# Patient Record
Sex: FEMALE | Race: BLACK OR AFRICAN AMERICAN | NOT HISPANIC OR LATINO | URBAN - METROPOLITAN AREA
[De-identification: names, ages, dates, MRNs, and addresses within clinical notes are randomized per-mention and may not be internally consistent; named-entity substitution may affect disease eponyms.]

---

## 2018-05-09 ENCOUNTER — HOSPITAL ENCOUNTER (EMERGENCY)
Facility: CLINIC | Age: 23
Discharge: HOME | End: 2018-05-09
Attending: EMERGENCY MEDICINE | Admitting: EMERGENCY MEDICINE
Payer: COMMERCIAL

## 2018-05-09 VITALS
HEART RATE: 63 BPM | DIASTOLIC BLOOD PRESSURE: 91 MMHG | TEMPERATURE: 98.4 F | SYSTOLIC BLOOD PRESSURE: 123 MMHG | RESPIRATION RATE: 14 BRPM | OXYGEN SATURATION: 98 %

## 2018-05-09 DIAGNOSIS — F31.60 BIPOLAR AFFECTIVE DISORDER, CURRENT EPISODE MIXED, CURRENT EPISODE SEVERITY UNSPECIFIED (H): ICD-10-CM

## 2018-05-09 DIAGNOSIS — F10.920 ALCOHOLIC INTOXICATION WITHOUT COMPLICATION (H): ICD-10-CM

## 2018-05-09 LAB — ALCOHOL BREATH TEST: 0.11 (ref 0–0.01)

## 2018-05-09 PROCEDURE — 90791 PSYCH DIAGNOSTIC EVALUATION: CPT

## 2018-05-09 PROCEDURE — 99285 EMERGENCY DEPT VISIT HI MDM: CPT | Mod: 25

## 2018-05-09 ASSESSMENT — ENCOUNTER SYMPTOMS
NERVOUS/ANXIOUS: 1
HALLUCINATIONS: 0

## 2018-05-09 NOTE — ED AVS SNAPSHOT
Emergency Department    [address] [facility] [address]    Phone:  [phone]    Fax:  [phone]                                       Zuleika Sales   MRN: [id number]    Department:   Emergency Department   Date of Visit:  5/9/2018           After Visit Summary Signature Page     I have received my discharge instructions, and my questions have been answered. I have discussed any challenges I see with this plan with the nurse or doctor.    ..........................................................................................................................................  Patient/Patient Representative Signature      ..........................................................................................................................................  Patient Representative Print Name and Relationship to Patient    ..................................................               ................................................  Date                                            Time    ..........................................................................................................................................  Reviewed by Signature/Title    ...................................................              ..............................................  Date                                                            Time

## 2018-05-09 NOTE — DISCHARGE INSTRUCTIONS
"  Alcohol Intoxication  Alcohol intoxication occurs when you drink alcohol faster than your liver can remove it from your system. The following facts are important to remember:    It can take 10 minutes or more to start to feel the effects of a drink, so you can easily get more intoxicated than you intended.    One drink may be more than 1 serving of alcohol. Depending on the drink, it can be 2 to 4 servings.    It takes about an hour for your body to metabolize (clear) 1 serving. If you have more than 1 drink, it can take a couple of hours or more.    Many things affect how drinks will affect you, including whether you ve eaten, how fast you drink, your size, how much you normally drink (or not), medicines you take, chronic diseases you have, and gender.  Signs and symptoms of alcohol poisoning  The following are signs and symptoms of alcohol poisoning:  Mild impairment    Reduced inhibitions    Slurred speech    Drowsiness    Decreased fine motor skills  Moderate impairment    Erratic behavior, aggression, depression    Impaired judgment    Confusion    Concentration difficulties    Coordination problems  Severe impairment    Vomiting    Seizures    Unconsciousness    Cold, clammy    Slow or irregular breathing    Hypothermia (low body temperature)    Coma  Health effects  Alcohol abuse causes health problems. Sometimes this can happen after only drinking a  little.\" There is no set number of drinks or amount of alcohol that defines too much. The more you drink at one time, and the more frequently you drink determine both the short-term and long-term health effects. It affects all parts of your body and your health, including your:    Brain. Alcohol is a central nervous system depressant. It can damage parts of the brain that affect your balance, memory, thinking, and emotions. It can cause memory loss, blackouts, depression, agitation, sleep cycle changes, and seizures. These changes may or may not be " reversible.    Heart and vascular system. Alcohol affects multiple areas. It can damage heart muscle causing cardiomyopathy, which is a weakening and stretching of the heart muscle. This can lead to trouble breathing, an irregular heartbeat, atrial fibrillation, leg swelling, and heart failure. It makes the blood vessels stiffen causing hypertension (high blood pressure). All of these problems increase your risk of having heart attacks or strokes.    Liver. Alcohol causes fat to build up in the liver, affecting its normal function. This increases the risk for hepatitis, leading to abdominal pain, appetite loss, jaundice, bleeding problems, liver fibrosis, and cirrhosis. This in turn can affect your ability to fight off infections, and can cause diabetes. The liver changes prevent it from removing toxins in your blood that can cause encephalopathy. Signs of this are confusion, altered level of consciousness, personality changes, memory loss, seizures, coma, and death.    Pancreas. Alcohol can cause inflammation of the pancreas, or pancreatitis. This can cause pain in your abdomen, fever, and diabetes.    Immune system. Alcohol weakens your immune system in a number of ways. It suppresses your immune system making it harder to fight off infections and colds. You will also have a higher risk of certain infections like pneumonia and tuberculosis.    Cancer risk. Alcohol raises your risk of cancer of the mouth, esophagus, pharynx, larynx, liver, and breast.    Sexual function. Alcohol abuse can also lead to sexual problems.  Alcohol use during pregnancy may cause permanent damage to the growing baby.  Home care  The following guidelines will help you care for yourself at home:    Don't drink any more alcohol.    Don't drive until all effects of the alcohol have worn off.    Don't operate machinery that can cause injuries.    Get lots of rest over the next few days. Drink plenty of water and other non-alcoholic liquids.  Try to eat regular meals.    If you have been drinking heavily on a daily basis, you may go through alcohol withdrawal. The usual symptoms last 3 to 4 days and may include nervousness, shakiness, nausea, sweating, sleeplessness, and can even cause seizures and a serious withdrawal symptom called delirium tremens, or DTs. During this time, it is best that you stay with family or friends who can help and support you. You can also admit yourself to a residential detox program. If your symptoms are severe (seizures, severe shakiness, confusion), contact your doctor or call an ambulance for help (see below).   Follow-up care  If alcohol is a problem in your life, these are some organizations that can help you:    Alcoholics Anonymous offers support through a self-help fellowship. There are no dues or fees. See the Yellow Pages and call for time and place of meetings. Find AA online at www.aa.org.    [employer] offers support to families of alcohol users. Contact 924-024-8144, or online at www.al-anovipin.org.    National [place] on Alcoholism and Drug Dependence can be reached at 728-293-5288, or online at www.ncadd.org.    There are also inpatient and residential alcohol detox programs. Check the Internet or phonebook Yellow Pages under  D[facility] Abuse and Treatment Centers.   Call 911  Call 911 if any of these occur:    Trouble breathing or slow irregular breathing    Chest pain    Sudden weakness on one side of your body or sudden trouble speaking    Heavy bleeding or vomiting blood    Very drowsy or trouble awakening    Fainting or loss of consciousness    Rapid heart rate    Seizure  When to seek medical advice  Call your healthcare provider right away if any of these occur:    Severe shakiness     Fever of 100.4 F (38 C) or higher, or as directed by your healthcare provider    Confusion or hallucinations (seeing, hearing, or feeling things that are not there)    Pain in your upper abdomen that gets worse    Repeated  "vomiting  Date Last Reviewed: 6/1/2016 2000-2017 [employer]. [address] [place] [address], [place] [address]. All rights reserved. This information is not intended as a substitute for professional medical care. Always follow your healthcare professional's instructions.          Bipolar Disorder  Bipolar disorder is an illness that causes strong mood swings between depression and  carlee . It used to be called \"manic depression.\" The mood swings are different from the normal ups and downs we all experience in our lives. They are more severe, last longer, and can interfere with work and relationships. These episodes are changes from our usual moods and behavior. Their severity can be mild, or drastic and explosive.    In a manic episode, you may think fast and do things quickly. It may seem like you are getting a lot done. At first, this may feel very good. But in the extreme this can lead to a lifestyle that is disorganized, chaotic, and includes risky behavior (spending sprees, sexual acting-out, or drug use). In later stages, it may affect eating (no interest in food) and sleeping (unable to sleep for days at a time). Speech may speed up and become difficult for others to understand. You may appear to others as if you are in your own world.    In a depressive episode, you may feel a lack of interest in normal activities. Sometimes there is sadness or guilt without any clear reason. Thinking may become slow and there can be a lack energy or feeling of hopelessness. Some people have thoughts of harming themselves at this stage. Thoughts can even turn to suicide.  Between these phases you may actually feel OK. This does not mean that the illness is gone. People with this disorder will usually have to treat it all of their life. Medicine and good care can greatly reduce the symptoms.  The exact cause of this illness is unknown. However, there is a genetic link that makes a person more likely to get this " problem. Also, the use of drugs such as speed (amphetamine) and cocaine increase the chances of this illness appearing.  Home care  Here is what you can do at home:    Ongoing care and support help people manage this disease. Find a healthcare provider and therapist who meet your needs. Seek help when you feel like you may be heading into either a manic episode or a depressive state.    Be sure to take your medicine and get regular blood work to check the levels of medicine in your body. Take the medicine and get the follow-up lab work as prescribed, even if you think you don t need to do it.    Be certain to tell each of your healthcare providers about all of the prescription and over-the-counter medicines, and supplements you take. Certain supplements interact with medicines and result in dangerous side effects. You can also use your pharmacist as a resource person when you have questions about medicine interactions.    Talk with your family and trusted friends about your thoughts and feelings. Ask them to help you recognize behavior changes early so you can get help and medicines can be adjusted.    Alcohol and drugs can bring on an episode, and make them worse    If your life is severely impacted by this illness, the Americans with Disabilities Act (ADA) may provide help. The ADA protects people with chronic physical and mental health problems. If you are having trouble keeping jobs, managing workplace issues, or caring for yourself because of your bipolar disorder, contact your local ADA office to see if it can help. The [place] Department of [employer] operates a toll-free ADA information line at: 108.847.6696 (Voice); or 565-146-7020 (TTY). It can help you locate a local office.    Follow-up care  Follow up with your healthcare provider or therapist as advised. They can help you to find ways to improve your life.  Call 911  Call 911 if any of these happen:    You have suicidal thoughts, a plan, and the means to  harm  yourself, or serious thoughts of hurting someone else    Trouble breathing    Confusion    Drowsiness or trouble wakening    Fainting or loss of consciousness    Rapid heart rate, very low heart rate, or a new irregular heart rate    Seizure    New chest pain that becomes more severe, lasts longer, or spreads into your shoulder, arm, neck, jaw, or back  When to seek medical advice  Call your healthcare provider right away if any of these happen:    Feeling like your symptoms are getting worse (depression, agitation, and excess energy)    Unable to eat or sleep for more than 48 hours    Feeling out of control (racing thoughts, or poor concentration)    Feeling like you want to harm yourself or another    Being unable to care for yourself  Date Last Reviewed: 10/1/2017    9769-2759 [employer]. [address] [place] [address], [place] [address]. All rights reserved. This information is not intended as a substitute for professional medical care. Always follow your healthcare professional's instructions.

## 2018-05-09 NOTE — ED PROVIDER NOTES
History     Chief Complaint:  Alcohol Intoxication    HPI   Zuleika Sales is a 23-year-old female with a reported history of bipolar disorder who presents to the emergency department in a police hold for evaluation of alcohol intoxication.  Per police report, the patient was walking around hotel parking lots and was paranoid stating that she thinks people are trying to kill her.  The patient blew 0.18 on the police breathalyzer.  The police were concerned that the patient was not able to take care of herself so they brought her in for evaluation.    The patient states that she was drinking with her cousin tonight, when all of a sudden her cousin got quite agitated with her and left her in a parking lot.  The patient states that her cousin's child was in a car in the parking lot and she was watching over this child while the cousin was doing things in the apartment.  The cousin then came out to the parking lot and got quite angry with the patient and drove away.    The patient denies any suicidal ideation.  She states that she has not been taking her bipolar medications for over 1 year because she did not like the way they made her feel.  She states that she has been managing moderately well without them.  She notes that she does still experience periods of highs and lows.  In regards to the paranoia, the patient states that she is chronically generally scared that someone will kill her. She is not suicidal. She is not fearful of anyone person.  She denies any hallucinations.    The patient denies any drug intake tonight.  She states that she has otherwise been well and has not had any recent illness. She does not use alcohol regularly, she just turned 23 this past week.      Allergies:  No known drug allergies.     Medications:    No daily medications.     Past Medical History:    Bipolar disorder     Past Surgical History:    Surgical history reviewed. No pertinent surgical history.    Family History:     History reviewed. No pertinent family history.     Social History:  Presents to the ED via police escort     Review of Systems   Psychiatric/Behavioral: Negative for hallucinations, self-injury and suicidal ideas. The patient is nervous/anxious.         Positive for alcohol intoxication and paranoia.   All other systems reviewed and are negative.    Physical Exam     Patient Vitals for the past 24 hrs:   BP Heart Rate SpO2   05/09/18 0405 133/40 126 98 %        Physical Exam  Constitutional:  Smells of alcohol.  Patient is oriented to person, place, and time. They appear well-developed and well-nourished.    HENT:   Mouth/Throat:   Oropharynx is clear and moist.   Eyes:    Conjunctivae are injected, EOM are normal. Pupils are equal, round, and reactive to light.   Neck:    Normal range of motion.   Cardiovascular: Normal rate, regular rhythm and normal heart sounds.  Exam reveals no gallop and no friction rub.  No murmur heard.  Pulmonary/Chest:  Effort normal and breath sounds normal. Patient has no wheezes. Patient has no rales.   Abdominal:   Soft. Bowel sounds are normal. Patient exhibits no mass. There is no tenderness. There is no rebound and no guarding.   Musculoskeletal:  Normal range of motion. Patient exhibits no edema.   Neurological:   Patient is alert and oriented to person, place, and time. Patient has normal strength. No cranial nerve deficit or sensory deficit. GCS 15.   Skin:   Skin is warm and dry. No rash noted. No erythema.   Psychiatric:   Tearful, emotional.  States that she is chronically afraid that somebody will hurt her.  Denies feeling suicidal.    Emergency Department Course   Nursing notes and vitals reviewed.    (6162) I entered the room with my scribe, obtained the history, and performed an exam of the patient as documented above.    The care of the patient was signed out to the oncoming physician.     Impression & Plan    Medical Decision Making:  Zuleika Sales is a  23-year-old female brought in on a transport hold by police concerned for her safety.  They state that she is paranoid that somebody is going to kill her.  She states that this is how she always feels.  She denies feeling suicidal or homicidal.  She denies any hallucinations.  She does endorse using alcohol tonight, but no other illicit drugs.  She states she is relatively naïve to alcohol as she just turned 23.  She did breathalyzed at 0.18, which is consistent with her mental status.  I see no signs of injury on her, there are no report of falls or altercation tonight.  At this time she does require a DEC assessment. TeleDEC is nonfunctional at this time.  The machinery simply does not work, so she will have to wait for the day DEC  to come in.  While the patient does understand this, she is upset about having to stay.  She has been relatively cooperative throughout her stay here.  I will sign out to the a.m. physician pending her DEC assessment.      Diagnosis:    ICD-10-CM   1. Alcoholic intoxication without complication (H) F10.920   2. Bipolar affective disorder, current episode mixed, current episode severity unspecified (H) F31.60     Disposition:  Care of the patient signed out to incoming physician pending DEC assessment.        [facility] EMERGENCY DEPARTMENT         Scribe disclosure:   I, Mook Strickland, am serving as a scribe on 5/9/2018 at 5:28 AM to personally document services performed by Dr. Mendes based on my observations and the provider's statements to me.                Joyce Mendes MD  05/09/18 0712

## 2018-05-09 NOTE — ED NOTES
ED course:  Patient received as a handoff for my partner Dr. Mendes.  On face-to-face evaluation patient denies SI/HI/hallucinations and defers resources for alcohol abuse. Seen by DEC. Patient was offered voluntary admission and deferred.  No indication for hold.  Provided resources for close outpatient follow-up. At the time of discharge patient was clinically sober.    Impression:    ICD-10-CM    1. Alcoholic intoxication without complication (H) F10.920    2. Bipolar affective disorder, current episode mixed, current episode severity unspecified (H) F31.60      Disposition:  Discharged to self-care.     Ryan Jacoob,   05/09/18 6688

## 2018-05-09 NOTE — ED NOTES
Bed: ED17  Expected date: 5/9/18  Expected time: 3:55 AM  Means of arrival: Ambulance  Comments:  Sea Flores 23F  Eval; intox./ hold

## 2018-05-09 NOTE — ED AVS SNAPSHOT
Emergency Department    [address] [facility] [address]    Phone:  [phone]    Fax:  [phone]                                       Zuleika Sales   MRN: [id number]    Department:   Emergency Department   Date of Visit:  5/9/2018           Patient Information     Date Of Birth          1995        Your diagnoses for this visit were:     Alcoholic intoxication without complication (H)     Bipolar affective disorder, current episode mixed, current episode severity unspecified (H)        You were seen by Joyce Mendes MD and Ryan Jacobo DO.      Follow-up Information     Follow up with No Ref-Primary, Physician.    Why:  Avoid alcohol. No driving today. Follow up with resources. Return if thoughts of harming yourself or others        Discharge Instructions         Alcohol Intoxication  Alcohol intoxication occurs when you drink alcohol faster than your liver can remove it from your system. The following facts are important to remember:    It can take 10 minutes or more to start to feel the effects of a drink, so you can easily get more intoxicated than you intended.    One drink may be more than 1 serving of alcohol. Depending on the drink, it can be 2 to 4 servings.    It takes about an hour for your body to metabolize (clear) 1 serving. If you have more than 1 drink, it can take a couple of hours or more.    Many things affect how drinks will affect you, including whether you ve eaten, how fast you drink, your size, how much you normally drink (or not), medicines you take, chronic diseases you have, and gender.  Signs and symptoms of alcohol poisoning  The following are signs and symptoms of alcohol poisoning:  Mild impairment    Reduced inhibitions    Slurred speech    Drowsiness    Decreased fine motor skills  Moderate impairment    Erratic behavior, aggression, depression    Impaired judgment    Confusion    Concentration  "difficulties    Coordination problems  Severe impairment    Vomiting    Seizures    Unconsciousness    Cold, clammy    Slow or irregular breathing    Hypothermia (low body temperature)    Coma  Health effects  Alcohol abuse causes health problems. Sometimes this can happen after only drinking a  little.\" There is no set number of drinks or amount of alcohol that defines too much. The more you drink at one time, and the more frequently you drink determine both the short-term and long-term health effects. It affects all parts of your body and your health, including your:    Brain. Alcohol is a central nervous system depressant. It can damage parts of the brain that affect your balance, memory, thinking, and emotions. It can cause memory loss, blackouts, depression, agitation, sleep cycle changes, and seizures. These changes may or may not be reversible.    Heart and vascular system. Alcohol affects multiple areas. It can damage heart muscle causing cardiomyopathy, which is a weakening and stretching of the heart muscle. This can lead to trouble breathing, an irregular heartbeat, atrial fibrillation, leg swelling, and heart failure. It makes the blood vessels stiffen causing hypertension (high blood pressure). All of these problems increase your risk of having heart attacks or strokes.    Liver. Alcohol causes fat to build up in the liver, affecting its normal function. This increases the risk for hepatitis, leading to abdominal pain, appetite loss, jaundice, bleeding problems, liver fibrosis, and cirrhosis. This in turn can affect your ability to fight off infections, and can cause diabetes. The liver changes prevent it from removing toxins in your blood that can cause encephalopathy. Signs of this are confusion, altered level of consciousness, personality changes, memory loss, seizures, coma, and death.    Pancreas. Alcohol can cause inflammation of the pancreas, or pancreatitis. This can cause pain in your abdomen, " fever, and diabetes.    Immune system. Alcohol weakens your immune system in a number of ways. It suppresses your immune system making it harder to fight off infections and colds. You will also have a higher risk of certain infections like pneumonia and tuberculosis.    Cancer risk. Alcohol raises your risk of cancer of the mouth, esophagus, pharynx, larynx, liver, and breast.    Sexual function. Alcohol abuse can also lead to sexual problems.  Alcohol use during pregnancy may cause permanent damage to the growing baby.  Home care  The following guidelines will help you care for yourself at home:    Don't drink any more alcohol.    Don't drive until all effects of the alcohol have worn off.    Don't operate machinery that can cause injuries.    Get lots of rest over the next few days. Drink plenty of water and other non-alcoholic liquids. Try to eat regular meals.    If you have been drinking heavily on a daily basis, you may go through alcohol withdrawal. The usual symptoms last 3 to 4 days and may include nervousness, shakiness, nausea, sweating, sleeplessness, and can even cause seizures and a serious withdrawal symptom called delirium tremens, or DTs. During this time, it is best that you stay with family or friends who can help and support you. You can also admit yourself to a residential detox program. If your symptoms are severe (seizures, severe shakiness, confusion), contact your doctor or call an ambulance for help (see below).   Follow-up care  If alcohol is a problem in your life, these are some organizations that can help you:    Alcoholics Anonymous offers support through a self-help fellowship. There are no dues or fees. See the Yellow Pages and call for time and place of meetings. Find AA online at www.aa.org.    [employer] offers support to families of alcohol users. Contact 928-140-0987, or online at www.al-anon.org.    National [place] on Alcoholism and Drug Dependence can be reached at 910-944-9768,  "or online at www.ncadd.org.    There are also inpatient and residential alcohol detox programs. Check the Internet or phonebook Yellow Pages under  Drug Abuse and Treatment Centers.   Call 911  Call 911 if any of these occur:    Trouble breathing or slow irregular breathing    Chest pain    Sudden weakness on one side of your body or sudden trouble speaking    Heavy bleeding or vomiting blood    Very drowsy or trouble awakening    Fainting or loss of consciousness    Rapid heart rate    Seizure  When to seek medical advice  Call your healthcare provider right away if any of these occur:    Severe shakiness     Fever of 100.4 F (38 C) or higher, or as directed by your healthcare provider    Confusion or hallucinations (seeing, hearing, or feeling things that are not there)    Pain in your upper abdomen that gets worse    Repeated vomiting  Date Last Reviewed: 6/1/2016 2000-2017 [employer]. [address] [place] [address]. All rights reserved. This information is not intended as a substitute for professional medical care. Always follow your healthcare professional's instructions.          Bipolar Disorder  Bipolar disorder is an illness that causes strong mood swings between depression and  carlee . It used to be called \"manic depression.\" The mood swings are different from the normal ups and downs we all experience in our lives. They are more severe, last longer, and can interfere with work and relationships. These episodes are changes from our usual moods and behavior. Their severity can be mild, or drastic and explosive.    In a manic episode, you may think fast and do things quickly. It may seem like you are getting a lot done. At first, this may feel very good. But in the extreme this can lead to a lifestyle that is disorganized, chaotic, and includes risky behavior (spending sprees, sexual acting-out, or drug use). In later stages, it may affect eating (no interest in food) and " sleeping (unable to sleep for days at a time). Speech may speed up and become difficult for others to understand. You may appear to others as if you are in your own world.    In a depressive episode, you may feel a lack of interest in normal activities. Sometimes there is sadness or guilt without any clear reason. Thinking may become slow and there can be a lack energy or feeling of hopelessness. Some people have thoughts of harming themselves at this stage. Thoughts can even turn to suicide.  Between these phases you may actually feel OK. This does not mean that the illness is gone. People with this disorder will usually have to treat it all of their life. Medicine and good care can greatly reduce the symptoms.  The exact cause of this illness is unknown. However, there is a genetic link that makes a person more likely to get this problem. Also, the use of drugs such as speed (amphetamine) and cocaine increase the chances of this illness appearing.  Home care  Here is what you can do at home:    Ongoing care and support help people manage this disease. Find a healthcare provider and therapist who meet your needs. Seek help when you feel like you may be heading into either a manic episode or a depressive state.    Be sure to take your medicine and get regular blood work to check the levels of medicine in your body. Take the medicine and get the follow-up lab work as prescribed, even if you think you don t need to do it.    Be certain to tell each of your healthcare providers about all of the prescription and over-the-counter medicines, and supplements you take. Certain supplements interact with medicines and result in dangerous side effects. You can also use your pharmacist as a resource person when you have questions about medicine interactions.    Talk with your family and trusted friends about your thoughts and feelings. Ask them to help you recognize behavior changes early so you can get help and medicines can  be adjusted.    Alcohol and drugs can bring on an episode, and make them worse    If your life is severely impacted by this illness, the Americans with Disabilities Act (ADA) may provide help. The ADA protects people with chronic physical and mental health problems. If you are having trouble keeping jobs, managing workplace issues, or caring for yourself because of your bipolar disorder, contact your local ADA office to see if it can help. The [employer] operates a toll-free ADA information line at: 962.803.2713 (Voice); or 116-733-6079 (TTY). It can help you locate a local office.    Follow-up care  Follow up with your healthcare provider or therapist as advised. They can help you to find ways to improve your life.  Call 911  Call 911 if any of these happen:    You have suicidal thoughts, a plan, and the means to  harm yourself, or serious thoughts of hurting someone else    Trouble breathing    Confusion    Drowsiness or trouble wakening    Fainting or loss of consciousness    Rapid heart rate, very low heart rate, or a new irregular heart rate    Seizure    New chest pain that becomes more severe, lasts longer, or spreads into your shoulder, arm, neck, jaw, or back  When to seek medical advice  Call your healthcare provider right away if any of these happen:    Feeling like your symptoms are getting worse (depression, agitation, and excess energy)    Unable to eat or sleep for more than 48 hours    Feeling out of control (racing thoughts, or poor concentration)    Feeling like you want to harm yourself or another    Being unable to care for yourself  Date Last Reviewed: 10/1/2017    9604-8136 [employer]. [address] [place] [address], [place] [address]. All rights reserved. This information is not intended as a substitute for professional medical care. Always follow your healthcare professional's instructions.          24 Hour Appointment Hotline       To make an appointment at any [facility]  clinic, call [phone] ([phone]). If you don't have a family doctor or clinic, we will help you find one. [facility] are conveniently located to serve the needs of you and your family.             Review of your medicines      Notice     You have not been prescribed any medications.            Procedures and tests performed during your visit     Alcohol breath test POCT      Orders Needing Specimen Collection     None      Pending Results     No orders found from 5/7/2018 to 5/10/2018.            Pending Culture Results     No orders found from 5/7/2018 to 5/10/2018.            Pending Results Instructions     If you had any lab results that were not finalized at the time of your Discharge, you can call the ED Lab Result RN at [phone]. You will be contacted by this team for any positive Lab results or changes in treatment. The nurses are available 7 days a week from 10A to 6:30P.  You can leave a message 24 hours per day and they will return your call.        Test Results From Your Hospital Stay        5/9/2018  8:44 AM      Component Results     Component Value Ref Range & Units Status    Alcohol Breath Test 0.109 (A) 0.00 - 0.01 Final                Clinical Quality Measure: Blood Pressure Screening     Your blood pressure was checked while you were in the emergency department today. The last reading we obtained was  BP: 104/77 . Please read the guidelines below about what these numbers mean and what you should do about them.  If your systolic blood pressure (the top number) is less than 120 and your diastolic blood pressure (the bottom number) is less than 80, then your blood pressure is normal. There is nothing more that you need to do about it.  If your systolic blood pressure (the top number) is 120-139 or your diastolic blood pressure (the bottom number) is 80-89, your blood pressure may be higher than it should be. You should have your blood pressure rechecked within a year by a  "primary care provider.  If your systolic blood pressure (the top number) is 140 or greater or your diastolic blood pressure (the bottom number) is 90 or greater, you may have high blood pressure. High blood pressure is treatable, but if left untreated over time it can put you at risk for heart attack, stroke, or kidney failure. You should have your blood pressure rechecked by a primary care provider within the next 4 weeks.  If your provider in the emergency department today gave you specific instructions to follow-up with your doctor or provider even sooner than that, you should follow that instruction and not wait for up to 4 weeks for your follow-up visit.        Thank you for choosing [facility]       Thank you for choosing [facility] for your care. Our goal is always to provide you with excellent care. Hearing back from our patients is one way we can continue to improve our services. Please take a few minutes to complete the written survey that you may receive in the mail after you visit with us. Thank you!        [employer] Information     [employer] lets you send messages to your doctor, view your test results, renew your prescriptions, schedule appointments and more. To sign up, go to [email or web address] . Click on \"Log in\" on the left side of the screen, which will take you to the Welcome page. Then click on \"Sign up Now\" on the right side of the page.     You will be asked to enter the access code listed below, as well as some personal information. Please follow the directions to create your username and password.     Your access code is: [email or web address]  Expires: 2018 11:34 AM     Your access code will  in 90 days. If you need help or a new code, please call your [facility] clinic or [phone].        Care EveryWhere ID     This is your Care EveryWhere ID. This could be used by other organizations to access your [facility] medical records  [id number]        Equal Access to Services     HODAN DEVINE AH: " Hadii augustine Comer, waanithada linda, qaashleyta kaalmarybel patiño. So [facility] [phone].    ATENCIÓN: Si habla español, tiene a leblanc disposición servicios gratuitos de asistencia lingüística. Llame al [phone].    We comply with applicable federal civil rights laws and [place] laws. We do not discriminate on the basis of race, color, national origin, age, disability, sex, sexual orientation, or gender identity.            After Visit Summary       This is your record. Keep this with you and show to your community pharmacist(s) and doctor(s) at your next visit.

## 2023-02-12 ENCOUNTER — APPOINTMENT (OUTPATIENT)
Dept: ULTRASOUND IMAGING | Facility: CLINIC | Age: 28
End: 2023-02-12
Attending: EMERGENCY MEDICINE
Payer: COMMERCIAL

## 2023-02-12 ENCOUNTER — HOSPITAL ENCOUNTER (EMERGENCY)
Facility: CLINIC | Age: 28
Discharge: HOME | End: 2023-02-12
Attending: EMERGENCY MEDICINE | Admitting: EMERGENCY MEDICINE
Payer: COMMERCIAL

## 2023-02-12 VITALS
RESPIRATION RATE: 16 BRPM | BODY MASS INDEX: 22.08 KG/M2 | OXYGEN SATURATION: 99 % | DIASTOLIC BLOOD PRESSURE: 56 MMHG | HEART RATE: 74 BPM | SYSTOLIC BLOOD PRESSURE: 94 MMHG | WEIGHT: 120 LBS | TEMPERATURE: 98 F | HEIGHT: 62 IN

## 2023-02-12 DIAGNOSIS — O23.10: ICD-10-CM

## 2023-02-12 DIAGNOSIS — R10.2 PELVIC PAIN IN FEMALE: ICD-10-CM

## 2023-02-12 DIAGNOSIS — Z34.92 SECOND TRIMESTER PREGNANCY: ICD-10-CM

## 2023-02-12 DIAGNOSIS — F19.20 CHEMICAL DEPENDENCY (H): ICD-10-CM

## 2023-02-12 LAB
ABO/RH(D): NORMAL
ALBUMIN SERPL BCG-MCNC: 3.5 G/DL (ref 3.5–5.2)
ALBUMIN UR-MCNC: 50 MG/DL
ALP SERPL-CCNC: 63 U/L (ref 35–104)
ALT SERPL W P-5'-P-CCNC: 8 U/L (ref 10–35)
ANION GAP SERPL CALCULATED.3IONS-SCNC: 14 MMOL/L (ref 7–15)
ANTIBODY SCREEN: NEGATIVE
APPEARANCE UR: CLEAR
AST SERPL W P-5'-P-CCNC: 19 U/L (ref 10–35)
BASOPHILS # BLD AUTO: 0 10E3/UL (ref 0–0.2)
BASOPHILS NFR BLD AUTO: 0 %
BILIRUB SERPL-MCNC: 0.3 MG/DL
BILIRUB UR QL STRIP: NEGATIVE
BUN SERPL-MCNC: 9.3 MG/DL (ref 6–20)
CALCIUM SERPL-MCNC: 9.5 MG/DL (ref 8.6–10)
CHLORIDE SERPL-SCNC: 99 MMOL/L (ref 98–107)
COLOR UR AUTO: YELLOW
CREAT SERPL-MCNC: 0.58 MG/DL (ref 0.51–0.95)
DEPRECATED HCO3 PLAS-SCNC: 20 MMOL/L (ref 22–29)
EOSINOPHIL # BLD AUTO: 0 10E3/UL (ref 0–0.7)
EOSINOPHIL NFR BLD AUTO: 0 %
ERYTHROCYTE [DISTWIDTH] IN BLOOD BY AUTOMATED COUNT: 15 % (ref 10–15)
GFR SERPL CREATININE-BSD FRML MDRD: >90 ML/MIN/1.73M2
GLUCOSE SERPL-MCNC: 91 MG/DL (ref 70–99)
GLUCOSE UR STRIP-MCNC: NEGATIVE MG/DL
HCG INTACT+B SERPL-ACNC: ABNORMAL MIU/ML
HCT VFR BLD AUTO: 32.8 % (ref 35–47)
HGB BLD-MCNC: 10.6 G/DL (ref 11.7–15.7)
HGB UR QL STRIP: NEGATIVE
HOLD SPECIMEN: NORMAL
HOLD SPECIMEN: NORMAL
IMM GRANULOCYTES # BLD: 0.1 10E3/UL
IMM GRANULOCYTES NFR BLD: 1 %
KETONES UR STRIP-MCNC: >150 MG/DL
LEUKOCYTE ESTERASE UR QL STRIP: ABNORMAL
LYMPHOCYTES # BLD AUTO: 1.4 10E3/UL (ref 0.8–5.3)
LYMPHOCYTES NFR BLD AUTO: 11 %
MCH RBC QN AUTO: 24.4 PG (ref 26.5–33)
MCHC RBC AUTO-ENTMCNC: 32.3 G/DL (ref 31.5–36.5)
MCV RBC AUTO: 76 FL (ref 78–100)
MONOCYTES # BLD AUTO: 0.6 10E3/UL (ref 0–1.3)
MONOCYTES NFR BLD AUTO: 4 %
MUCOUS THREADS #/AREA URNS LPF: PRESENT /LPF
NEUTROPHILS # BLD AUTO: 10.6 10E3/UL (ref 1.6–8.3)
NEUTROPHILS NFR BLD AUTO: 84 %
NITRATE UR QL: NEGATIVE
NRBC # BLD AUTO: 0 10E3/UL
NRBC BLD AUTO-RTO: 0 /100
PH UR STRIP: 5.5 [PH] (ref 5–7)
PLATELET # BLD AUTO: 231 10E3/UL (ref 150–450)
POTASSIUM SERPL-SCNC: 3.9 MMOL/L (ref 3.4–5.3)
PROT SERPL-MCNC: 6.4 G/DL (ref 6.4–8.3)
RBC # BLD AUTO: 4.34 10E6/UL (ref 3.8–5.2)
RBC URINE: 2 /HPF
SODIUM SERPL-SCNC: 133 MMOL/L (ref 136–145)
SP GR UR STRIP: 1.03 (ref 1–1.03)
SPECIMEN EXPIRATION DATE: NORMAL
SQUAMOUS EPITHELIAL: 5 /HPF
UROBILINOGEN UR STRIP-MCNC: NORMAL MG/DL
WBC # BLD AUTO: 12.7 10E3/UL (ref 4–11)
WBC URINE: 18 /HPF

## 2023-02-12 PROCEDURE — 86901 BLOOD TYPING SEROLOGIC RH(D): CPT | Performed by: EMERGENCY MEDICINE

## 2023-02-12 PROCEDURE — 258N000003 HC RX IP 258 OP 636: Performed by: EMERGENCY MEDICINE

## 2023-02-12 PROCEDURE — 86850 RBC ANTIBODY SCREEN: CPT | Performed by: EMERGENCY MEDICINE

## 2023-02-12 PROCEDURE — 85025 COMPLETE CBC W/AUTO DIFF WBC: CPT | Performed by: EMERGENCY MEDICINE

## 2023-02-12 PROCEDURE — 96361 HYDRATE IV INFUSION ADD-ON: CPT

## 2023-02-12 PROCEDURE — 96365 THER/PROPH/DIAG IV INF INIT: CPT

## 2023-02-12 PROCEDURE — 36415 COLL VENOUS BLD VENIPUNCTURE: CPT | Performed by: EMERGENCY MEDICINE

## 2023-02-12 PROCEDURE — 99285 EMERGENCY DEPT VISIT HI MDM: CPT | Mod: 25

## 2023-02-12 PROCEDURE — 84702 CHORIONIC GONADOTROPIN TEST: CPT | Performed by: EMERGENCY MEDICINE

## 2023-02-12 PROCEDURE — 250N000011 HC RX IP 250 OP 636: Performed by: EMERGENCY MEDICINE

## 2023-02-12 PROCEDURE — 87086 URINE CULTURE/COLONY COUNT: CPT | Performed by: EMERGENCY MEDICINE

## 2023-02-12 PROCEDURE — 81003 URINALYSIS AUTO W/O SCOPE: CPT | Performed by: EMERGENCY MEDICINE

## 2023-02-12 PROCEDURE — 76805 OB US >/= 14 WKS SNGL FETUS: CPT

## 2023-02-12 PROCEDURE — 80053 COMPREHEN METABOLIC PANEL: CPT | Performed by: EMERGENCY MEDICINE

## 2023-02-12 RX ORDER — ONDANSETRON 4 MG/1
4 TABLET, ORALLY DISINTEGRATING ORAL EVERY 6 HOURS PRN
Qty: 10 TABLET | Refills: 0 | Status: SHIPPED | OUTPATIENT
Start: 2023-02-12

## 2023-02-12 RX ORDER — CEFTRIAXONE 1 G/1
1 INJECTION, POWDER, FOR SOLUTION INTRAMUSCULAR; INTRAVENOUS ONCE
Status: COMPLETED | OUTPATIENT
Start: 2023-02-12 | End: 2023-02-12

## 2023-02-12 RX ORDER — NITROFURANTOIN 25; 75 MG/1; MG/1
100 CAPSULE ORAL 2 TIMES DAILY
Qty: 14 CAPSULE | Refills: 0 | Status: SHIPPED | OUTPATIENT
Start: 2023-02-13 | End: 2023-02-24

## 2023-02-12 RX ADMIN — SODIUM CHLORIDE 1000 ML: 9 INJECTION, SOLUTION INTRAVENOUS at 19:57

## 2023-02-12 RX ADMIN — SODIUM CHLORIDE 1000 ML: 9 INJECTION, SOLUTION INTRAVENOUS at 18:20

## 2023-02-12 RX ADMIN — CEFTRIAXONE SODIUM 1 G: 1 INJECTION, POWDER, FOR SOLUTION INTRAMUSCULAR; INTRAVENOUS at 20:50

## 2023-02-12 ASSESSMENT — ENCOUNTER SYMPTOMS
HEMATURIA: 0
VOMITING: 1
DIARRHEA: 1
DYSURIA: 0
ABDOMINAL PAIN: 1
FREQUENCY: 0
DIFFICULTY URINATING: 0
SLEEP DISTURBANCE: 1
BACK PAIN: 1

## 2023-02-12 ASSESSMENT — ACTIVITIES OF DAILY LIVING (ADL)
ADLS_ACUITY_SCORE: 35
ADLS_ACUITY_SCORE: 35
ADLS_ACUITY_SCORE: 33

## 2023-02-12 NOTE — ED TRIAGE NOTES
Pt states that she took a pregnancy test 4 days ago because her breasts were tender.  Pt is unsure of how far along she is; states that her  passed away in October and she has not bled since then.  Pt also notes, the pregnancy is not that far along.  She believes upwards of 3 months.  Pt has hx of 2 ectopic pregnancies.  Developed back and and abd pain last evening.

## 2023-02-12 NOTE — DISCHARGE INSTRUCTIONS
You need to follow-up with a Maternal and Fetal Medicine doctor within the next 1-2 weeks and have a formal ultrasound of the baby since there may be a small irregularity in your baby's heart on the ultrasound.

## 2023-02-12 NOTE — ED PROVIDER NOTES
History     Chief Complaint:  Abdominal Pain       HPI   Zuleika Rose is a 27 year old female, , who presents with symptoms and concerns for a pregnancy. The patient reports that she took a pregnancy test 4 days ago because her breasts were tender. She explains that she is unsure of how long she has been pregnant since her last period was in 2022 and her last sexual activity was in December. She admits having a history of irregular periods. Zuleika states that she has been smoking fentanyl and has quit since she discovered her pregnancy. She endorses right back pain, abdominal pain, withdrawal symptoms, sleep disturbance, vomiting, and diarrhea. The patient denies any vaginal bleeding, vaginal discharge, fever, or urinary symptoms. Of note, the patient recently lost her  by gunshot. She does not have any kids. Zuleika reports no current health issues or current use of medication.      Independent Historian: Patient    Review of External Notes: I reviewed the patient's chart review and Care Everywhere.     ROS:  Review of Systems   Gastrointestinal: Positive for abdominal pain, diarrhea and vomiting.   Genitourinary: Negative for decreased urine volume, difficulty urinating, dysuria, frequency, hematuria, urgency, vaginal bleeding and vaginal discharge.   Musculoskeletal: Positive for back pain (right).   Psychiatric/Behavioral: Positive for sleep disturbance.   All other systems reviewed and are negative.      Allergies:  No Known Allergies     Medications:    The patient is not currently taking any prescribed medications.    Past Medical History:    Irregular Periods  Polysubstance abuse  Asthma  MDD  Insomnia    Social History:  Patient reports former tobacco use. Patient reports drug use. Drug: Fentanyl  Patient arrives by private vehicle alone  PCP: [facility]     Physical Exam     Patient Vitals for the past 24 hrs:   BP Temp Temp src Pulse Resp SpO2 Height  "Weight   02/12/23 1830 (!) 89/56 -- -- 93 -- 100 % -- --   02/12/23 1825 96/55 -- -- 77 -- 100 % -- --   02/12/23 1552 113/57 98  F (36.7  C) Temporal 79 18 99 % 1.575 m (5' 2\") 54.4 kg (120 lb)        Physical Exam  Nursing note and vitals reviewed.  Constitutional:  Appears well-developed and well-nourished.   HENT:   Head:    Atraumatic.   Mouth/Throat:   Oropharynx is clear and moist. No oropharyngeal exudate.   Eyes:    Pupils are equal, round, and reactive to light. Mild scleral icterus  Neck:    Normal range of motion. Neck supple.      No tracheal deviation present. No thyromegaly present.   Cardiovascular:  Normal rate, regular rhythm, no murmur   Pulmonary/Chest: Breath sounds are clear and equal without wheezes or crackles.  Abdominal:   Soft. Bowel sounds are normal. Exhibits no distension and      no mass. Mild axial tenderness in the lower abdomen.     There is no rebound and no guarding.   Musculoskeletal:  Exhibits no edema.   Lymphadenopathy:  No cervical adenopathy.   Neurological:   Alert and oriented to person, place, and time.   Skin:    Skin is warm and dry. No rash noted. No pallor.     Emergency Department Course   Imaging:  US OB > 14 Weeks   Final Result   IMPRESSION:     1.  Single living intrauterine gestation.   2.  Estimated gestational age of 17 weeks 5 days with EDC of 07/18/2023.   3.  Incomplete assessment of fetal anatomy, with notable echogenic intracardiac focus at the left ventricle. Maternal-fetal medicine follow-up is recommended for a formal fetal anatomy scan between 18-22 weeks gestational age.         Report per radiology    Laboratory:  Labs Ordered and Resulted from Time of ED Arrival to Time of ED Departure   HCG QUANTITATIVE PREGNANCY - Abnormal       Result Value    hCG Quantitative 18,404 (*)    COMPREHENSIVE METABOLIC PANEL - Abnormal    Sodium 133 (*)     Potassium 3.9      Chloride 99      Carbon Dioxide (CO2) 20 (*)     Anion Gap 14      Urea Nitrogen 9.3      " Creatinine 0.58      Calcium 9.5      Glucose 91      Alkaline Phosphatase 63      AST 19      ALT 8 (*)     Protein Total 6.4      Albumin 3.5      Bilirubin Total 0.3      GFR Estimate >90     CBC WITH PLATELETS AND DIFFERENTIAL - Abnormal    WBC Count 12.7 (*)     RBC Count 4.34      Hemoglobin 10.6 (*)     Hematocrit 32.8 (*)     MCV 76 (*)     MCH 24.4 (*)     MCHC 32.3      RDW 15.0      Platelet Count 231      % Neutrophils 84      % Lymphocytes 11      % Monocytes 4      % Eosinophils 0      % Basophils 0      % Immature Granulocytes 1      NRBCs per 100 WBC 0      Absolute Neutrophils 10.6 (*)     Absolute Lymphocytes 1.4      Absolute Monocytes 0.6      Absolute Eosinophils 0.0      Absolute Basophils 0.0      Absolute Immature Granulocytes 0.1      Absolute NRBCs 0.0     ROUTINE UA WITH MICROSCOPIC REFLEX TO CULTURE   TYPE AND SCREEN, ADULT    ABO/RH(D) B POS      Antibody Screen Negative      SPECIMEN EXPIRATION DATE [id number]     ABO/RH TYPE AND SCREEN          Emergency Department Course & Assessments:      Interventions/Assessments:   I obtained history and examined the patient as noted above  192 I reexamined the patient and explained findings  Medications   0.9% sodium chloride BOLUS (1,000 mLs Intravenous New Bag 23 1820)        Independent Interpretation (X-rays, CTs, rhythm strip):  I independently interpreted the patient's Ultrasound in real time       Social Determinants of Health affecting care:    . Polysubstance use    Disposition:  The patient was discharged to home.     Impression & Plan    Medical Decision Making:  I found this patient to be in her second trimester of pregnancy with a viable pregnancy.  I discussed with the patient that there may be a cardiac abnormality on ultrasound so she was referred to follow-up with [place] maternal-fetal medicine to have a formal ultrasound at 18 to 22 weeks which I discussed with her.  She has a possible UTI so she  was given ceftriaxone IV here and discharged on 1 week of Macrobid.  She does not have any sign of endometritis, appendicitis, ectopic pregnancy, sepsis, blood loss anemia, pyelonephritis or other concerning findings.  I feel she can be safely discharged home.  She is prescribed Zofran since she has had some recent vomiting and given IV fluid hydration here.  She was told to return if she has any worsening of her condition such as worsening pain or any development of fever or recurrent vomiting.  She was told also follow-up in her [facility] clinic this week to discuss her chemical dependency issues and she was given [place] substance abuse resources.  She reports that she had been smoking fentanyl but that she stopped 4 days ago when she found out she was pregnant.      Diagnosis:    ICD-10-CM    1. Second trimester pregnancy  Z34.92       2. Pelvic pain in female  R10.2       3. Bladder infection in mother during pregnancy, antepartum  O23.10       4. Chemical dependency (H)  F19.20            Discharge Medications:  Discharge Medication List as of 2/12/2023  9:17 PM      START taking these medications    Details   nitroFURantoin macrocrystal-monohydrate (MACROBID) 100 MG capsule Take 1 capsule (100 mg) by mouth 2 times daily, Disp-14 capsule, R-0, E-Prescribe      ondansetron (ZOFRAN ODT) 4 MG ODT tab Take 1 tablet (4 mg) by mouth every 6 hours as needed for nausea or vomiting, Disp-10 tablet, R-0, E-Prescribe                Scribe Disclosure:  I, Amador Multani, am serving as a scribe at 5:39 PM on 2/12/2023 to document services personally performed by Ivonne Pickering MD based on my observations and the provider's statements to me.   2/12/2023   Ivonne Pickering MD [place], Cheri Lee, MD  02/12/23 8087       Ivonne Pickering MD  02/12/23 2289

## 2023-02-13 NOTE — ED NOTES
Pt helped to bathroom and pt became dizzy and hot. Pt was escorted back to the bed, vitals stable. Started feeling better after sitting on bed. Used commode shortly after and did not feel dizzy for that.

## 2023-02-14 LAB — BACTERIA UR CULT: NORMAL

## 2023-02-24 ENCOUNTER — PRENATAL OFFICE VISIT (OUTPATIENT)
Dept: NURSING | Facility: CLINIC | Age: 28
End: 2023-02-24

## 2023-02-24 DIAGNOSIS — O09.92 SUPERVISION OF HIGH RISK PREGNANCY IN SECOND TRIMESTER: Primary | ICD-10-CM

## 2023-02-24 DIAGNOSIS — O09.91 SUPERVISION OF HIGH RISK PREGNANCY IN FIRST TRIMESTER: ICD-10-CM

## 2023-02-24 PROBLEM — O09.90 SUPERVISION OF HIGH-RISK PREGNANCY: Status: ACTIVE | Noted: 2023-02-24

## 2023-02-24 PROCEDURE — 99207 PR NO CHARGE NURSE ONLY: CPT

## 2023-02-24 ASSESSMENT — ANXIETY QUESTIONNAIRES
GAD7 TOTAL SCORE: 9
1. FEELING NERVOUS, ANXIOUS, OR ON EDGE: SEVERAL DAYS
IF YOU CHECKED OFF ANY PROBLEMS ON THIS QUESTIONNAIRE, HOW DIFFICULT HAVE THESE PROBLEMS MADE IT FOR YOU TO DO YOUR WORK, TAKE CARE OF THINGS AT HOME, OR GET ALONG WITH OTHER PEOPLE: SOMEWHAT DIFFICULT
5. BEING SO RESTLESS THAT IT IS HARD TO SIT STILL: MORE THAN HALF THE DAYS
7. FEELING AFRAID AS IF SOMETHING AWFUL MIGHT HAPPEN: SEVERAL DAYS
3. WORRYING TOO MUCH ABOUT DIFFERENT THINGS: SEVERAL DAYS
6. BECOMING EASILY ANNOYED OR IRRITABLE: SEVERAL DAYS
2. NOT BEING ABLE TO STOP OR CONTROL WORRYING: MORE THAN HALF THE DAYS
GAD7 TOTAL SCORE: 9

## 2023-02-24 ASSESSMENT — PATIENT HEALTH QUESTIONNAIRE - PHQ9
SUM OF ALL RESPONSES TO PHQ QUESTIONS 1-9: 12
5. POOR APPETITE OR OVEREATING: SEVERAL DAYS

## 2023-02-24 NOTE — PROGRESS NOTES
SUBJECTIVE:     HPI:    This is a 27 year old female patient,  who presents for her first obstetrical visit.    BEVERLEY: 2023, by Ultrasound.  She is 19w3d weeks.  Her cycles are irregular.  Her last menstrual period was normal but back in october.   Since her LMP, she fatigue, cramping, constipation.   She denies nausea, emesis, headache, vaginal discharge, dysuria, pelvic pain, urinary urgency, lightheadedness, urinary frequency, vaginal bleeding and hemorrhoids.    Additional History: -Late Prenatal Care at 17wks in ED and US dated  -pt reports her   Oct 8th of overdose - love of her life, went through lots of stress and emotions; ignored the pregnancy sx as emotional stress;   -admits to using Fentanyl to cope; has hx of alcoholism but stopped yrs ago; stopped Fentanyl after her US in ER  -daily smoker-trying to quit  -Lives w her biological brother who is a great support to her  -hx of ectopic and removal of right fallopian tube  -hx 2 IAB w meds  -reports hx of jaundice since she was a kid - need more info  -hx of anxiety/depression - no meds but admits may benefit from therapy  -care coordination referral placed at time of nurse visit    -See ED notes from 23 - US notes: FETAL ANOMALY SCREEN: An incomplete assessment of fetal anatomy was performed. Notable echogenic intracardiac focus at the left ventricle. MFM recommended.      Have you travelled during the pregnancy?No  Have your sexual partner(s) travelled during the pregnancy?No    HISTORY:   Planned Pregnancy: No  Marital Status:   Occupation: unemployed  Living in Household: Other Relatives    Past History:  Her past medical history   Past Medical History:   Diagnosis Date     Depressive disorder     hx of Zoloft     Hypertension     years ago- meds and now off     Uncomplicated asthma    .      She has a history of  First Pregnancy    Since her last LMP she admits to the use of Fentanyl and tobacco.    Past medical,  surgical, social and family history were reviewed and updated in [facility].    Current Outpatient Medications   Medication     Prenatal Vit-Fe Fumarate-FA (PRENATAL VITAMINS PO)     ondansetron (ZOFRAN ODT) 4 MG ODT tab     No current facility-administered medications for this visit.       ROS:   12 point review of systems negative other than symptoms noted below or in the HPI.  Constitutional: Fatigue and Loss of Appetite  Gastrointestinal: Constipation    Nurse phone visit completed. Prenatal book and folder (containing standard educational hand-outs and brochures) jamee be given at next visit to patient. Information in folder reviewed over the phone. Questions answered. Brochure given on optional screening available to assess chromosomal anomalies. Pt UNSURE NIPS. Pt advised to call the clinic if she has any questions or concerns related to her pregnancy. Prenatal labs future ordered. New prenatal visit scheduled on 3/3/23 with Kirstie Reyna RN on 2/24/2023 at 10:28 AM    No results found for: PAP    PHQ-9 score:    PHQ 2/24/2023   PHQ-9 Total Score 12   Q9: Thoughts of better off dead/self-harm past 2 weeks Not at all     JANAK-7 SCORE 2/24/2023   Total Score 9     Patient supplied answers from flow sheet for:  Prenatal OB Questionnaire.  Past Medical History  Have you ever recieved care for your mental health? : (!) Yes  Have you ever been in a major accident or suffered serious trauma?: No  Within the last year, has anyone hit, slapped, kicked or otherwise hurt you?: No  In the last year, has anyone forced you to have sex when you didn't want to?: No    Past Medical History 2   Have you ever received a blood transfusion?: No  Would you accept a blood transfusion if was medically recommended?: Yes  Does anyone in your home smoke?: No   Is your blood type Rh negative?: No  Have you ever ?: No  Have you been hospitalized for a nonsurgical reason excluding normal delivery?: (!) Yes  Have you  ever had an abnormal pap smear?: No    Past Medical History (Continued)  Do you have a history of abnormalities of the uterus?: No  Did your mother take ALTON or any other hormones when she was pregnant with you?: No  Do you have any other problems we have not asked about which you feel may be important to this pregnancy?: (!) Yes

## 2023-02-27 ENCOUNTER — PATIENT OUTREACH (OUTPATIENT)
Dept: CARE COORDINATION | Facility: CLINIC | Age: 28
End: 2023-02-27
Payer: COMMERCIAL

## 2023-02-27 NOTE — LETTER
[facility] CARE COORDINATION    March 20, 2023    Zuleika Rose  [address]  [place] [address]      Dear Zuleika,        I am a clinic community health worker who works at the [facility] [place] with [facility]. I wanted to introduce myself and provide you with my contact information for you to be able to call me with any questions or concerns. Below is a description of clinic care coordination and how I can further assist you.       The clinic care coordination team is made up of a registered nurse, , financial resource worker and community health worker who understand the health care system. The goal of clinic care coordination is to help you manage your health and improve access to the health care system. Our team works alongside your provider to assist you in determining your health and social needs. We can help you obtain health care and community resources, providing you with necessary information and education. We can work with you through any barriers and develop a care plan that helps coordinate and strengthen the communication between you and your care team.    Please feel free to contact me with any questions or concerns regarding care coordination and what we can offer.      We are focused on providing you with the highest-quality healthcare experience possible.    Sincerely,         EFRAIN Vizcarra  Clinic Care Coordination  [facility]: [place] [place], [place], [place], and [facility]  Phone: [phone]

## 2023-02-27 NOTE — LETTER
[facility] CARE COORDINATION  Care Coordination  [facility]  [address]  [address]  [place]  [address]      March 1, 2023    Zuleika Rose  [address]  [place] [address]      Dear Zuleika,        I am a clinic community health worker who works at [facility] in [place]. I have been trying to reach you recently to introduce Clinic Care Coordination. Below is a description of clinic care coordination and how I can further assist you.       The clinic care coordination team is made up of a registered nurse, , financial resource worker and community health worker who understand the health care system. The goal of clinic care coordination is to help you manage your health and improve access to the health care system. Our team works alongside your provider to assist you in determining your health and social needs. We can help you obtain health care and community resources, providing you with necessary information and education. We can work with you through any barriers and develop a care plan that helps coordinate and strengthen the communication between you and your care team.    Please feel free to contact me with any questions or concerns regarding care coordination and what we can offer.      We are focused on providing you with the highest-quality healthcare experience possible.    Sincerely,       EFRAIN Vizcarra  Clinic Care Coordination  [facility]: [place] [place], [place], [place], and [place] [facility]  Phone: [phone]

## 2023-02-27 NOTE — PROGRESS NOTES
Clinic Care Coordination Contact  [facility]/Voicemail    Referral Source: PCP  Clinical Data: Care Coordinator Outreach    Reason for Referral:    Financial Support    Mental Wellness (Health) (Mental Illness/Chemical Dependency)    Insurance    Other- Late prenatal care; drug use hx in pregnancy; hx of depression and her   of overdose 10/20/22; unemployed and needs resources; living w brother    Outreach attempted x 1.  Left message on patient's voicemail with call back information and requested return call.    Plan: Care Coordinator will try to reach patient again in 1-2 business days.    EFRAIN Vizcarra  Clinic Care Coordination  [facility]: [place] [place], [place], [place], and [place] [facility]  Phone: [phone]

## 2023-02-28 NOTE — PROGRESS NOTES
Clinic Care Coordination Contact  [facility]/Voicemail    Referral Source: PCP  Clinical Data: Care Coordinator Outreach    Reason for Referral:    Financial Support    Mental Wellness (Health) (Mental Illness/Chemical Dependency)    Insurance    Other- Late prenatal care; drug use hx in pregnancy; hx of depression and her   of overdose 10/20/22; unemployed and needs resources; living w brother    Outreach attempted x  2.  Left message on patient's voicemail with call back information and requested return call.    Plan: Care Coordinator will try to reach patient again in 1-2 business days.    EFRAIN Vizcarra  Clinic Care Coordination  [facility]: [place] [place], [place], [place], and [place] [facility]  Phone: [phone]

## 2023-03-01 ENCOUNTER — ANCILLARY PROCEDURE (OUTPATIENT)
Dept: ULTRASOUND IMAGING | Facility: CLINIC | Age: 28
End: 2023-03-01
Payer: COMMERCIAL

## 2023-03-01 DIAGNOSIS — O09.92 SUPERVISION OF HIGH RISK PREGNANCY IN SECOND TRIMESTER: ICD-10-CM

## 2023-03-01 PROCEDURE — 76805 OB US >/= 14 WKS SNGL FETUS: CPT | Performed by: OBSTETRICS & GYNECOLOGY

## 2023-03-01 NOTE — PROGRESS NOTES
Clinic Care Coordination Contact  [facility]/Voicemail    Referral Source: PCP  Clinical Data: Care Coordinator Outreach    Reason for Referral:    Financial Support    Mental Wellness (Health) (Mental Illness/Chemical Dependency)    Insurance    Other- Late prenatal care; drug use hx in pregnancy; hx of depression and her   of overdose 10/20/22; unemployed and needs resources; living w brother         Outreach attempted x 3.  Left message on patient's voicemail with call back information and requested return call.    Plan: Care Coordinator will send care coordination introduction letter with care coordinator contact information and explanation of care coordination services via mail.     Care Coordinator will try to reach patient again in 3-5 business days.    EFRAIN Vizcarra  Clinic Care Coordination  [facility] Clinics: [place], and [facility]  Phone: [phone]

## 2023-03-02 PROBLEM — J45.20 INTERMITTENT ASTHMA WITHOUT COMPLICATION: Status: ACTIVE | Noted: 2019-02-27

## 2023-03-02 PROBLEM — H52.13 NEARSIGHTEDNESS, BILATERAL: Status: ACTIVE | Noted: 2018-06-15

## 2023-03-02 PROBLEM — N92.6 IRREGULAR PERIODS: Status: ACTIVE | Noted: 2021-07-06

## 2023-03-03 NOTE — PROGRESS NOTES
Clinic Care Coordination Contact  [facility]/Voicemail    Referral Source: PCP  Clinical Data: Care Coordinator Outreach    Reason for Referral:    Financial Support    Mental Wellness (Health) (Mental Illness/Chemical Dependency)    Insurance    Other- Late prenatal care; drug use hx in pregnancy; hx of depression and her   of overdose 10/20/22; unemployed and needs resources; living w brother       Outreach attempted x 4.  Left message on patient's voicemail with call back information and requested return call.    Plan: Care Coordinator will send care coordination introduction letter with care coordinator contact information and explanation of care coordination services via mail.     Care Coordinator will do no further outreaches at this time.    EFRAIN Vizcarra  Clinic Care Coordination  [facility]: [place], and [facility]  Phone: [phone]

## 2023-03-04 ENCOUNTER — TELEPHONE (OUTPATIENT)
Dept: MIDWIFE SERVICES | Facility: CLINIC | Age: 28
End: 2023-03-04
Payer: COMMERCIAL

## 2023-03-04 NOTE — CONFIDENTIAL NOTE
Attempted to call Zuleika de la rosa to discuss ultrasound results from 3/1/2023. Phone line rang twice and then went to busy signal. Unable to reach patient at this time.    Pt was schedule for NOB on 3/3/2023, but rescheduled to 3/20/2023.     Recommend MFM referral given history of substance use this pregnancy, circumvallate placenta, and suboptimal views on anatomy scan.     Plan to attempt contacting patient at a later time/date.    POLA FelderM